# Patient Record
Sex: FEMALE | Race: BLACK OR AFRICAN AMERICAN | NOT HISPANIC OR LATINO | Employment: OTHER | URBAN - METROPOLITAN AREA
[De-identification: names, ages, dates, MRNs, and addresses within clinical notes are randomized per-mention and may not be internally consistent; named-entity substitution may affect disease eponyms.]

---

## 2017-01-26 ENCOUNTER — OFFICE VISIT (OUTPATIENT)
Dept: ENDOCRINOLOGY | Facility: CLINIC | Age: 52
End: 2017-01-26
Payer: MEDICARE

## 2017-01-26 ENCOUNTER — LAB VISIT (OUTPATIENT)
Dept: LAB | Facility: HOSPITAL | Age: 52
End: 2017-01-26
Attending: INTERNAL MEDICINE
Payer: MEDICARE

## 2017-01-26 VITALS
HEART RATE: 83 BPM | SYSTOLIC BLOOD PRESSURE: 132 MMHG | DIASTOLIC BLOOD PRESSURE: 78 MMHG | BODY MASS INDEX: 32.93 KG/M2 | WEIGHT: 185.88 LBS | HEIGHT: 63 IN

## 2017-01-26 DIAGNOSIS — E11.42 DIABETIC POLYNEUROPATHY ASSOCIATED WITH TYPE 2 DIABETES MELLITUS: Primary | Chronic | ICD-10-CM

## 2017-01-26 DIAGNOSIS — E11.42 DIABETIC POLYNEUROPATHY ASSOCIATED WITH TYPE 2 DIABETES MELLITUS: Chronic | ICD-10-CM

## 2017-01-26 DIAGNOSIS — E11.10 DIABETIC KETOACIDOSIS WITHOUT COMA ASSOCIATED WITH TYPE 2 DIABETES MELLITUS: ICD-10-CM

## 2017-01-26 DIAGNOSIS — E16.2 HYPOGLYCEMIA: ICD-10-CM

## 2017-01-26 LAB
ANION GAP SERPL CALC-SCNC: 18 MMOL/L
BUN SERPL-MCNC: 35 MG/DL
CALCIUM SERPL-MCNC: 9.3 MG/DL
CHLORIDE SERPL-SCNC: 91 MMOL/L
CO2 SERPL-SCNC: 19 MMOL/L
CREAT SERPL-MCNC: 2.7 MG/DL
EST. GFR  (AFRICAN AMERICAN): 22 ML/MIN/1.73 M^2
EST. GFR  (NON AFRICAN AMERICAN): 19 ML/MIN/1.73 M^2
GLUCOSE SERPL-MCNC: 708 MG/DL
POTASSIUM SERPL-SCNC: 4.6 MMOL/L
SODIUM SERPL-SCNC: 128 MMOL/L

## 2017-01-26 PROCEDURE — 99999 PR PBB SHADOW E&M-EST. PATIENT-LVL IV: CPT | Mod: PBBFAC,,, | Performed by: NURSE PRACTITIONER

## 2017-01-26 PROCEDURE — 80048 BASIC METABOLIC PNL TOTAL CA: CPT | Mod: PO

## 2017-01-26 PROCEDURE — 36415 COLL VENOUS BLD VENIPUNCTURE: CPT | Mod: PO

## 2017-01-26 PROCEDURE — 99215 OFFICE O/P EST HI 40 MIN: CPT | Mod: S$PBB,,, | Performed by: NURSE PRACTITIONER

## 2017-01-26 RX ORDER — HYDRALAZINE HYDROCHLORIDE 50 MG/1
50 TABLET, FILM COATED ORAL 3 TIMES DAILY
Status: ON HOLD | COMMUNITY
End: 2017-03-31 | Stop reason: HOSPADM

## 2017-01-26 RX ORDER — GLUCAGON 1 MG
1 VIAL (EA) INJECTION ONCE
Status: ON HOLD | COMMUNITY
Start: 2016-12-19 | End: 2018-04-27 | Stop reason: HOSPADM

## 2017-01-26 RX ORDER — HYDROCODONE BITARTRATE AND ACETAMINOPHEN 10; 325 MG/1; MG/1
TABLET ORAL
COMMUNITY
Start: 2016-11-14 | End: 2017-03-29

## 2017-01-26 RX ORDER — HYDRALAZINE HYDROCHLORIDE 25 MG/1
25 TABLET, FILM COATED ORAL 3 TIMES DAILY
Status: ON HOLD | COMMUNITY
End: 2017-08-17 | Stop reason: HOSPADM

## 2017-01-26 RX ORDER — INSULIN DEGLUDEC 100 U/ML
22 INJECTION, SOLUTION SUBCUTANEOUS NIGHTLY
Qty: 15 SYRINGE | Refills: 6 | Status: ON HOLD | OUTPATIENT
Start: 2017-01-26 | End: 2017-03-06 | Stop reason: HOSPADM

## 2017-01-26 RX ORDER — HYDRALAZINE HYDROCHLORIDE 100 MG/1
TABLET, FILM COATED ORAL
COMMUNITY
Start: 2017-01-10 | End: 2017-01-26 | Stop reason: DRUGHIGH

## 2017-01-26 RX ORDER — INSULIN ASPART 100 [IU]/ML
INJECTION, SOLUTION INTRAVENOUS; SUBCUTANEOUS
Qty: 1 BOX | Refills: 6 | Status: ON HOLD | OUTPATIENT
Start: 2017-01-26 | End: 2017-03-06 | Stop reason: HOSPADM

## 2017-01-26 RX ORDER — LIDOCAINE 50 MG/G
OINTMENT TOPICAL
Status: ON HOLD | COMMUNITY
Start: 2017-01-18 | End: 2017-03-31 | Stop reason: HOSPADM

## 2017-01-26 NOTE — MR AVS SNAPSHOT
[facility] Endocrinology  [address] [facility] [address] [place] [address]  Phone: [phone]  Fax: [phone]                  Sherice Avalos   2017 10:00 AM   Office Visit    Description:  Female : 1965   Provider:  OSMANI Purdy,ANP-C   Department:  [place] - Endocrinology           Diagnoses this Visit        Comments    Diabetic polyneuropathy associated with type 2 diabetes mellitus    -  Primary     Uncontrolled type 2 diabetes mellitus with stage 3 chronic kidney disease, with long-term current use of insulin         Hypoglycemia                To Do List           Future Appointments        Provider Department Dept Phone    2017 12:30 PM URINE [facility] [phone]    2017 11:00 AM OSMANI Purdy,ANP-C [facility] [phone]      Goals (5 Years of Data)     None       These Medications        Disp Refills Start End    insulin degludec (TRESIBA FLEXTOUCH U-100) 100 unit/mL (3 mL) InPn 15 Syringe 6 2017     Inject 22 Units into the skin nightly. - Subcutaneous    Pharmacy: [facility] - [address] Ph #: [phone]       insulin aspart (NOVOLOG FLEXPEN) 100 unit/mL InPn pen 1 Box 6 2017     4 units with meals plus ss-for max dose of 10 units per meal (30 per day)    Pharmacy: [facility] - [address] Ph #: [phone]         [facility]     [facility] -  Assistance  Registered nurses in the [facility] provide clinical advisement, health education, appointment booking, and other advisory services.  Call for this free service at [phone].             Medications           Message regarding Medications     Verify the changes and/or additions to your medication regime listed below are the same as discussed with your clinician today.  If any of these changes or additions are incorrect, please  notify your healthcare provider.        START taking these NEW medications        Refills    insulin degludec (TRESIBA FLEXTOUCH U-100) 100 unit/mL (3 mL) InPn 6    Sig: Inject 22 Units into the skin nightly.    Class: Normal    Route: Subcutaneous    insulin aspart (NOVOLOG FLEXPEN) 100 unit/mL InPn pen 6    Si units with meals plus ss-for max dose of 10 units per meal (30 per day)    Class: Normal           Verify that the below list of medications is an accurate representation of the medications you are currently taking.  If none reported, the list may be blank. If incorrect, please contact your healthcare provider. Carry this list with you in case of emergency.           Current Medications     amlodipine (NORVASC) 10 MG tablet Take 1 tablet (10 mg total) by mouth every morning.    ergocalciferol (ERGOCALCIFEROL) 50,000 unit Cap Take 1 capsule (50,000 Units total) by mouth every 7 days.    ferrous gluconate (FERGON) 324 MG tablet Take 1 tablet (324 mg total) by mouth daily with breakfast.    furosemide (LASIX) 40 MG tablet Take 40 mg by mouth every morning.    gabapentin (NEURONTIN) 100 MG capsule Take 100 mg by mouth 2 (two) times daily.    GLUCAGON EMERGENCY KIT, HUMAN, 1 mg injection     hydrALAZINE (APRESOLINE) 25 MG tablet Take 25 mg by mouth 3 (three) times daily.    hydrALAZINE (APRESOLINE) 50 MG tablet Take 50 mg by mouth 3 (three) times daily.    hydrocodone-acetaminophen 10-325mg (NORCO)  mg Tab     insulin aspart (NOVOLOG) 100 unit/mL InPn pen Inject 4 Units into the skin 3 (three) times daily with meals.    insulin detemir (LEVEMIR FLEXTOUCH) 100 unit/mL (3 mL) SubQ InPn pen Inject 10 Units into the skin once daily. 10 units in morning    insulin detemir (LEVEMIR FLEXTOUCH) 100 unit/mL (3 mL) SubQ InPn pen Inject 11 Units into the skin every evening.    levothyroxine (SYNTHROID) 50 MCG tablet Take 50 mcg by mouth before breakfast.     lidocaine (XYLOCAINE) 5 % Oint ointment     lubiprostone  "(AMITIZA) 8 MCG Cap Take 8 mcg by mouth daily with breakfast.     metoprolol succinate (TOPROL-XL) 100 MG 24 hr tablet Take 1 tablet (100 mg total) by mouth 2 (two) times daily.    montelukast (SINGULAIR) 10 mg tablet Take 10 mg by mouth every evening.     NEXIUM 40 mg capsule TAKE ONE CAPSULE BY MOUTH ONCE DAILY    ondansetron (ZOFRAN) 4 MG tablet Take 4 mg by mouth every 6 (six) hours as needed for Nausea.     promethazine (PHENERGAN) 25 MG tablet Take 1 tablet (25 mg total) by mouth every 6 (six) hours as needed for Nausea.    ranolazine (RANEXA) 500 MG Tb12 Take 500 mg by mouth 2 (two) times daily.     sevelamer carbonate (RENVELA) 800 mg Tab Take 800 mg by mouth 3 (three) times daily with meals.    valsartan (DIOVAN) 40 MG tablet Take 1 tablet (40 mg total) by mouth every evening.    VENTOLIN HFA 90 mcg/actuation inhaler INHALE 1 PUFF BY MOUTH EVERY 6 TO 8 HOURS AS NEEDED    fluticasone-vilanterol (BREO) 100-25 mcg/dose diskus inhaler Inhale 1 puff into the lungs once daily.    insulin aspart (NOVOLOG FLEXPEN) 100 unit/mL InPn pen 4 units with meals plus ss-for max dose of 10 units per meal (30 per day)    insulin degludec (TRESIBA FLEXTOUCH U-100) 100 unit/mL (3 mL) InPn Inject 22 Units into the skin nightly.           Clinical Reference Information           Vital Signs - Last Recorded  Most recent update: 1/26/2017  9:51 AM by Pat Dowd LPN    BP Pulse Ht Wt LMP BMI    132/78 (BP Location: Right arm, Patient Position: Sitting, BP Method: Manual) 83 5' 3" (1.6 m) 84.3 kg (185 lb 13.6 oz) (LMP Unknown) 32.92 kg/m2      Blood Pressure          Most Recent Value    BP  132/78      Allergies as of 1/26/2017     Reglan [Metoclopramide Hcl]    Sulfa (Sulfonamide Antibiotics)    Warfarin      Immunizations Administered on Date of Encounter - 1/26/2017     None      Orders Placed During Today's Visit      Normal Orders This Visit    C-peptide     Glucose, fasting     Urinalysis     Future Labs/Procedures " Expected by Expires    Basic metabolic panel  1/26/2017 3/27/2018    Basic metabolic panel  1/26/2017 3/27/2018    C-peptide  1/26/2017 3/27/2018    C-peptide  1/26/2017 3/27/2018    Glucose, fasting  1/26/2017 3/27/2018    Glucose, fasting  1/26/2017 3/27/2018    Urinalysis  1/26/2017 3/27/2018    Microalbumin/creatinine urine ratio  5/29/2017 3/27/2018    Microalbumin/creatinine urine ratio  5/29/2017 3/27/2018

## 2017-01-26 NOTE — PROGRESS NOTES
Subjective:      Patient ID: Sherice Avalos is a 49 y.o. female.    Chief Complaint:  Urgent visit s/p hospital d/c     History of Present Illness  48 y/o AAF -with Type 2 DM since approx 2000 as well as HTN, CKD stage 3.   Pt recently admitted to hospital with glucose of 1210, and per daughter a diagnosis of pancreatitis.  Difficult situation.  Pt has never brought me glucose logs though repeated requested as well as the importance emphasized.  .Pt has previously been a Very questionable historian and family support also questionable though daughter present at last visit and todays.  At our last visit I pro was reviewed--which essentially indicated pt was missing insulin with meals with marked hyperglycemia. Also last visit logs indicated if pt was taking insulin the doses were erratic, were not ordered and made no logical sense.    Insulin doses:    Daughter and pt identify pt routine as:  Coffee--no breakfast--but pt states occasionally toast or a biscuit  --No lunch or little  --Supper in the evening.     Review of Systems   Objective:   Physical Exam   Constitutional: She is oriented to person, place, and time. She appears well-developed and well-nourished.     Lab Review:   Hemoglobin A1C   Date Value Range Status   8/26/2014 13.9* 4.5-6.2 % Final   5/9/2014 12.0* 4.5-6.2 % Final   2/13/2014 13.4* 4.5-6.2 % Final       Chemistry        Component Value Date/Time     2/13/2014 1025    K 3.9 2/13/2014 1025     2/13/2014 1025    CO2 25 2/13/2014 1025    BUN 26* 2/13/2014 1025    CREATININE 1.1 2/13/2014 1025    GLU 36* 2/13/2014 1025        Component Value Date/Time    CALCIUM 9.6 2/13/2014 1025    ALKPHOS 109 2/13/2014 1025    AST 15 2/13/2014 1025    ALT 10 2/13/2014 1025    BILITOT 0.2 2/13/2014 1025        Lab Results   Component Value Date    TSH 1.176 5/9/2014     Lab Results   Component Value Date    LDLCALC 123.0 2/13/2014     .  Assessment:     No diagnosis found.      Plan:   I  D/c lantus  and novolog    Start Novolog 70/30    15 units with breakfast which can include coffee and at least 1 slice of toast      25 units of 70/30 with supper.        ORDERS  1/26/17      4 mo with glucose log review.   External orders for glucose and C-peptide.

## 2017-01-26 NOTE — PROGRESS NOTES
Subjective:       Patient ID: Sherice Avalos is a 51 y.o. female.    Chief Complaint: Routine Type 2 DM f/u     HPI Pt is a 52 y/o AAF with a dx of Of Type 2 Dm in 2000 in setting of glucoses >1200 and pancreatitis.  Pt also with chronic conditions pending review including HTN, HLP.      Interim Event:  Pt  Not seen since Jan 2015--and then it was only a couple of times--Pt very poor historian-chart reviewed--freq admits r/t both hyper and hypoglycemia.  Daughter states ambulance called frequently r/t both high and low glucoses.  Chart reviewed.  Pt last admitted [facility] 3 f/t high glucoses--also note BNP of >12,000, and JADE.  No glucose logs.  States glucoses are up and down.  States compliance with shots and does not skip doses.  Daughter seems to be good support-takes lots of notes-but also limited understanding of DM nuances.  States pt also uses a Novolog ss--2 units for every 50 points over 200.       States  today,  79 yesterday.   Only had coffee with splenda and cream today--took 4 units of Novolog.    10:30 am--glucose > 500--8 units of novolog given.      Takes Levermir 10 and 11 units bid  Tripp 4-4-4 plus ss (200-250=+2).       Review of Systems   Constitutional: Negative for activity change and fatigue.   HENT: Negative for hearing loss and trouble swallowing.    Eyes: Positive for visual disturbance. Negative for photophobia.        Last Eye Exam   Respiratory: Positive for shortness of breath. Negative for cough.    Cardiovascular: Negative for chest pain and palpitations.   Gastrointestinal: Negative for constipation and diarrhea.   Genitourinary: Positive for frequency. Negative for urgency.   Musculoskeletal: Negative for arthralgias and myalgias.   Skin: Negative for rash and wound.   Neurological: Positive for numbness. Negative for weakness.   Psychiatric/Behavioral: Negative for sleep disturbance. The patient is not nervous/anxious.        Objective:      Physical Exam  "  Constitutional: She is oriented to person, place, and time. She appears well-developed and well-nourished.   Vital Signs  Pulse: 83  BP: 132/78  BP Location: Right arm  BP Method: Manual  Patient Position: Sitting  Pain Score:   7  Pain Loc: Back  Height and Weight  Height: 5' 3" (160 cm)  Weight: 84.3 kg (185 lb 13.6 oz)  BSA (Calculated - sq m): 1.94 sq meters  BMI (Calculated): 33  Weight in (lb) to have BMI = 25: 140.8]     HENT:   Head: Normocephalic and atraumatic.   Nose: Nose normal.   Mouth/Throat: Oropharynx is clear and moist.   adentulous    Eyes: Conjunctivae and EOM are normal. Pupils are equal, round, and reactive to light.   Neck: Normal range of motion. Neck supple. No tracheal deviation present.   Cardiovascular: Normal rate, regular rhythm and intact distal pulses.    Murmur heard.  Pulmonary/Chest: Effort normal and breath sounds normal.   Musculoskeletal: Normal range of motion. Edema: 1 pitting edema LE bilaerally    Feet: no open wounds or calluses.  L partial foot amputation r/t .     Protective Sensation (w/ 10 gram monofilament):  Right: Decreased  Left: Decreased    Visual Inspection:  Partial L foot amputation     Pedal Pulses:   Right: Present  Left: Present    Posterior tibialis:   Right:Absent  Left: Absent     Lymphadenopathy:     She has no cervical adenopathy.   Neurological: She is alert and oriented to person, place, and time.   Vibratory sensation to feet:     Skin: Skin is warm and dry.   Psychiatric: She has a normal mood and affect. Her behavior is normal. Thought content normal.       Assessment:         1. Diabetic polyneuropathy associated with type 2 diabetes mellitus  insulin degludec (TRESIBA FLEXTOUCH U-100) 100 unit/mL (3 mL) InPn    insulin aspart (NOVOLOG FLEXPEN) 100 unit/mL InPn pen    Basic metabolic panel    Microalbumin/creatinine urine ratio    Glucose, fasting    C-peptide    Glucose, fasting    C-peptide    Glucose, fasting    C-peptide    Basic " metabolic panel    Microalbumin/creatinine urine ratio    Urinalysis    Urinalysis   2. Uncontrolled type 2 diabetes mellitus with stage 3 chronic kidney disease, with long-term current use of insulin     3. Hypoglycemia     4. Diabetic ketoacidosis without coma associated with type 2 diabetes mellitus  -acute-high risk--to ER.         Plan:       8 units of Novolog SUB at 11 am.  Labs resulted.  AG 18, glucos 706. Tone Walls called-will take pt to ER.    --Suspect pt may be insulinopenic or Type 1--will check c-peptide in future.    -Suspect reeducation of not only patient but family members as well  -Importance of glucose log reitierated.     -Will d/c levemir and switch to 22 Tresiba u100 qhs (less nocturnal hypoglycemia)  Cont Tripp 4-4-4--but SS needs to be decreased  (180-230=+1 units ). Suspect ISF closer to 1:50,  Not 1:25--may be contributary to hypoglycemia    -Pt to fax log next week     ORDERS  1/26/16  F/uy 4 weeks glucose log review.

## 2017-02-22 ENCOUNTER — TELEPHONE (OUTPATIENT)
Dept: ENDOCRINOLOGY | Facility: CLINIC | Age: 52
End: 2017-02-22

## 2017-02-22 NOTE — TELEPHONE ENCOUNTER
Called pt regarding missed apt.  Pt stated that she didn't know she had an apt and would like a new one.  Told the pt the first available apt, she said no, and hung up the phone.

## 2017-03-02 PROBLEM — E11.10 DKA (DIABETIC KETOACIDOSES): Status: ACTIVE | Noted: 2017-03-02

## 2017-03-03 PROBLEM — R10.9 ABDOMINAL PAIN: Status: ACTIVE | Noted: 2017-03-03

## 2017-03-04 PROBLEM — N18.9 CHRONIC KIDNEY DISEASE: Status: ACTIVE | Noted: 2017-03-04

## 2017-03-29 PROBLEM — E10.22 TYPE 1 DIABETES MELLITUS WITH STAGE 3 CHRONIC KIDNEY DISEASE: Status: ACTIVE | Noted: 2017-03-29

## 2017-03-29 PROBLEM — N18.30 TYPE 1 DIABETES MELLITUS WITH STAGE 3 CHRONIC KIDNEY DISEASE: Status: ACTIVE | Noted: 2017-03-29

## 2017-04-12 PROBLEM — N18.30 CKD STAGE 3 SECONDARY TO DIABETES: Status: ACTIVE | Noted: 2017-03-04

## 2017-04-12 PROBLEM — E03.9 HYPOTHYROIDISM (ACQUIRED): Status: ACTIVE | Noted: 2017-04-12

## 2017-04-12 PROBLEM — E11.22 CKD STAGE 3 SECONDARY TO DIABETES: Status: ACTIVE | Noted: 2017-03-04

## 2017-04-12 PROBLEM — E11.10 DKA (DIABETIC KETOACIDOSES): Status: RESOLVED | Noted: 2017-03-02 | Resolved: 2017-04-12

## 2017-04-18 PROBLEM — E87.1 HYPONATREMIA: Status: ACTIVE | Noted: 2017-04-18

## 2017-04-18 PROBLEM — E11.10 DIABETIC KETOACIDOSIS WITHOUT COMA: Status: ACTIVE | Noted: 2017-04-18

## 2017-04-20 PROBLEM — N17.9 ACUTE RENAL FAILURE: Status: ACTIVE | Noted: 2017-04-20

## 2017-05-21 PROBLEM — E87.5 HYPERKALEMIA: Status: ACTIVE | Noted: 2017-05-21

## 2017-05-21 PROBLEM — R22.0 FACIAL SWELLING: Status: ACTIVE | Noted: 2017-05-21

## 2017-05-22 PROBLEM — R06.02 SOB (SHORTNESS OF BREATH): Status: ACTIVE | Noted: 2017-05-22

## 2017-07-15 PROBLEM — E11.00 HYPEROSMOLAR NON-KETOTIC STATE IN PATIENT WITH TYPE 2 DIABETES MELLITUS: Status: ACTIVE | Noted: 2017-07-15

## 2017-07-15 PROBLEM — E11.65 TYPE 2 DIABETES MELLITUS WITH HYPERGLYCEMIA, WITH LONG-TERM CURRENT USE OF INSULIN: Status: ACTIVE | Noted: 2017-07-15

## 2017-07-15 PROBLEM — Z79.4 TYPE 2 DIABETES MELLITUS WITH HYPERGLYCEMIA, WITH LONG-TERM CURRENT USE OF INSULIN: Status: ACTIVE | Noted: 2017-07-15

## 2017-07-16 PROBLEM — E66.9 OBESITY (BMI 30-39.9): Status: ACTIVE | Noted: 2017-07-16

## 2017-08-13 PROBLEM — E11.10 DKA (DIABETIC KETOACIDOSES): Status: ACTIVE | Noted: 2017-08-13

## 2017-08-13 PROBLEM — R19.7 DIARRHEA: Status: ACTIVE | Noted: 2017-08-13

## 2017-09-04 PROBLEM — N18.30 ACUTE RENAL FAILURE SUPERIMPOSED ON STAGE 3 CHRONIC KIDNEY DISEASE: Status: ACTIVE | Noted: 2017-09-04

## 2017-09-04 PROBLEM — E86.1 INTRAVASCULAR VOLUME DEPLETION: Status: ACTIVE | Noted: 2017-09-04

## 2017-09-04 PROBLEM — N17.9 ACUTE RENAL FAILURE SUPERIMPOSED ON STAGE 3 CHRONIC KIDNEY DISEASE: Status: ACTIVE | Noted: 2017-09-04

## 2017-09-05 PROBLEM — E10.10 DIABETIC KETOACIDOSIS WITHOUT COMA ASSOCIATED WITH TYPE 1 DIABETES MELLITUS: Status: ACTIVE | Noted: 2017-04-18

## 2017-09-05 PROBLEM — S81.801A WOUND OF RIGHT LOWER EXTREMITY: Status: ACTIVE | Noted: 2017-09-05

## 2017-09-08 PROBLEM — T36.95XA ANTIBIOTIC-ASSOCIATED DIARRHEA: Status: ACTIVE | Noted: 2017-09-08

## 2017-09-08 PROBLEM — R53.1 WEAKNESS: Status: ACTIVE | Noted: 2017-09-08

## 2017-09-08 PROBLEM — K52.1 ANTIBIOTIC-ASSOCIATED DIARRHEA: Status: ACTIVE | Noted: 2017-09-08

## 2017-09-12 PROBLEM — E11.10 DKA (DIABETIC KETOACIDOSES): Status: RESOLVED | Noted: 2017-08-13 | Resolved: 2017-09-12

## 2017-09-12 PROBLEM — K52.1 ANTIBIOTIC-ASSOCIATED DIARRHEA: Status: RESOLVED | Noted: 2017-09-08 | Resolved: 2017-09-12

## 2017-09-12 PROBLEM — T36.95XA ANTIBIOTIC-ASSOCIATED DIARRHEA: Status: RESOLVED | Noted: 2017-09-08 | Resolved: 2017-09-12

## 2017-10-12 PROBLEM — E10.10 DIABETIC KETOACIDOSIS WITHOUT COMA ASSOCIATED WITH TYPE 1 DIABETES MELLITUS: Status: RESOLVED | Noted: 2017-04-18 | Resolved: 2017-10-12

## 2017-10-12 PROBLEM — R22.0 FACIAL SWELLING: Status: RESOLVED | Noted: 2017-05-21 | Resolved: 2017-10-12

## 2017-10-12 PROBLEM — N18.30 ACUTE RENAL FAILURE SUPERIMPOSED ON STAGE 3 CHRONIC KIDNEY DISEASE: Status: RESOLVED | Noted: 2017-09-04 | Resolved: 2017-10-12

## 2017-10-12 PROBLEM — E87.5 HYPERKALEMIA: Status: RESOLVED | Noted: 2017-05-21 | Resolved: 2017-10-12

## 2017-10-12 PROBLEM — N17.9 ACUTE RENAL FAILURE SUPERIMPOSED ON STAGE 3 CHRONIC KIDNEY DISEASE: Status: RESOLVED | Noted: 2017-09-04 | Resolved: 2017-10-12

## 2017-10-12 PROBLEM — E87.1 HYPONATREMIA: Status: RESOLVED | Noted: 2017-04-18 | Resolved: 2017-10-12

## 2017-10-12 PROBLEM — E66.9 OBESITY (BMI 30-39.9): Status: RESOLVED | Noted: 2017-07-16 | Resolved: 2017-10-12

## 2017-10-12 PROBLEM — E11.00 HYPEROSMOLAR NON-KETOTIC STATE IN PATIENT WITH TYPE 2 DIABETES MELLITUS: Status: RESOLVED | Noted: 2017-07-15 | Resolved: 2017-10-12

## 2017-10-12 PROBLEM — R06.02 SOB (SHORTNESS OF BREATH): Status: RESOLVED | Noted: 2017-05-22 | Resolved: 2017-10-12

## 2017-10-12 PROBLEM — N17.9 ACUTE RENAL FAILURE: Status: RESOLVED | Noted: 2017-04-20 | Resolved: 2017-10-12

## 2017-10-12 PROBLEM — R53.1 WEAKNESS: Status: RESOLVED | Noted: 2017-09-08 | Resolved: 2017-10-12

## 2017-10-12 PROBLEM — S81.801A WOUND OF RIGHT LOWER EXTREMITY: Status: RESOLVED | Noted: 2017-09-05 | Resolved: 2017-10-12

## 2017-10-13 PROBLEM — M54.9 CHRONIC BACK PAIN: Status: ACTIVE | Noted: 2017-10-13

## 2017-10-13 PROBLEM — G89.29 CHRONIC BACK PAIN: Status: ACTIVE | Noted: 2017-10-13

## 2018-02-15 PROBLEM — R79.89 ELEVATED TROPONIN: Status: ACTIVE | Noted: 2018-02-15

## 2018-02-15 PROBLEM — N30.00 ACUTE CYSTITIS WITHOUT HEMATURIA: Status: ACTIVE | Noted: 2018-02-15

## 2018-02-28 PROBLEM — E10.10 TYPE 1 DIABETES MELLITUS WITH KETOACIDOSIS WITHOUT COMA: Status: ACTIVE | Noted: 2018-02-28

## 2018-02-28 PROBLEM — E10.10 DKA, TYPE 1: Status: ACTIVE | Noted: 2018-02-28

## 2018-02-28 PROBLEM — L02.92 FURUNCULOSIS: Status: ACTIVE | Noted: 2018-02-28

## 2018-04-22 PROBLEM — R11.2 NAUSEA WITH VOMITING: Status: ACTIVE | Noted: 2018-04-22

## 2018-04-22 PROBLEM — E11.10 DIABETIC KETOACIDOSIS WITHOUT COMA ASSOCIATED WITH TYPE 2 DIABETES MELLITUS: Status: ACTIVE | Noted: 2018-04-22

## 2018-04-26 PROBLEM — R11.2 NAUSEA WITH VOMITING: Status: RESOLVED | Noted: 2018-04-22 | Resolved: 2018-04-26

## 2018-04-26 PROBLEM — E11.10 DIABETIC KETOACIDOSIS WITHOUT COMA ASSOCIATED WITH TYPE 2 DIABETES MELLITUS: Status: RESOLVED | Noted: 2018-04-22 | Resolved: 2018-04-26

## 2018-04-26 PROBLEM — R19.7 DIARRHEA: Status: RESOLVED | Noted: 2017-08-13 | Resolved: 2018-04-26

## 2018-04-29 PROBLEM — I35.0 NONRHEUMATIC AORTIC VALVE STENOSIS: Status: ACTIVE | Noted: 2018-04-29

## 2018-04-29 PROBLEM — M54.50 LOW BACK PAIN: Status: ACTIVE | Noted: 2018-04-29

## 2018-06-11 PROBLEM — I10 PRIMARY HYPERTENSION: Status: ACTIVE | Noted: 2018-06-11

## 2018-07-11 PROBLEM — E10.10 DKA, TYPE 1: Status: RESOLVED | Noted: 2018-02-28 | Resolved: 2018-07-11

## 2018-09-26 PROBLEM — E87.70 VOLUME OVERLOAD: Status: ACTIVE | Noted: 2018-09-26

## 2018-10-02 PROBLEM — R06.02 SOB (SHORTNESS OF BREATH): Status: ACTIVE | Noted: 2018-10-02

## 2018-10-09 ENCOUNTER — TELEPHONE (OUTPATIENT)
Dept: CARDIOLOGY | Facility: CLINIC | Age: 53
End: 2018-10-09

## 2018-10-09 NOTE — TELEPHONE ENCOUNTER
Called pt and offered sooner appt. Pt was unable to make new provided time and decided to keep the previously scheduled appt.

## 2018-12-11 PROBLEM — N18.6 END STAGE RENAL DISEASE: Status: ACTIVE | Noted: 2018-12-11

## 2018-12-20 PROBLEM — I21.4 NSTEMI (NON-ST ELEVATED MYOCARDIAL INFARCTION): Status: ACTIVE | Noted: 2018-12-20

## 2018-12-20 PROBLEM — R79.89 ELEVATED TROPONIN: Status: RESOLVED | Noted: 2018-02-15 | Resolved: 2018-12-20

## 2018-12-20 PROBLEM — I48.92 ATRIAL FLUTTER: Status: ACTIVE | Noted: 2018-12-20

## 2019-02-07 PROBLEM — E10.51 TYPE 1 DIABETES MELLITUS WITH DIABETIC PERIPHERAL ANGIOPATHY WITHOUT GANGRENE: Status: ACTIVE | Noted: 2019-02-07

## 2019-02-07 PROBLEM — N18.30 TYPE 1 DIABETES MELLITUS WITH STAGE 3 CHRONIC KIDNEY DISEASE: Status: RESOLVED | Noted: 2017-03-29 | Resolved: 2019-02-07

## 2019-02-07 PROBLEM — E10.22 TYPE 1 DIABETES MELLITUS WITH STAGE 3 CHRONIC KIDNEY DISEASE: Status: RESOLVED | Noted: 2017-03-29 | Resolved: 2019-02-07

## 2019-02-07 PROBLEM — E10.10 TYPE 1 DIABETES MELLITUS WITH KETOACIDOSIS WITHOUT COMA: Status: RESOLVED | Noted: 2018-02-28 | Resolved: 2019-02-07

## 2019-02-07 PROBLEM — E11.65 TYPE 2 DIABETES MELLITUS WITH HYPERGLYCEMIA, WITH LONG-TERM CURRENT USE OF INSULIN: Status: RESOLVED | Noted: 2017-07-15 | Resolved: 2019-02-07

## 2019-02-07 PROBLEM — E87.70 VOLUME OVERLOAD: Status: RESOLVED | Noted: 2018-09-26 | Resolved: 2019-02-07

## 2019-02-07 PROBLEM — Z79.4 TYPE 2 DIABETES MELLITUS WITH HYPERGLYCEMIA, WITH LONG-TERM CURRENT USE OF INSULIN: Status: RESOLVED | Noted: 2017-07-15 | Resolved: 2019-02-07

## 2019-02-07 PROBLEM — N17.9 ACUTE RENAL FAILURE SUPERIMPOSED ON STAGE 3 CHRONIC KIDNEY DISEASE: Status: RESOLVED | Noted: 2017-09-04 | Resolved: 2019-02-07

## 2019-02-07 PROBLEM — I25.10 CORONARY ARTERY DISEASE INVOLVING NATIVE CORONARY ARTERY OF NATIVE HEART WITHOUT ANGINA PECTORIS: Status: ACTIVE | Noted: 2019-02-07

## 2019-02-07 PROBLEM — E87.1 HYPONATREMIA: Status: RESOLVED | Noted: 2017-04-18 | Resolved: 2019-02-07

## 2019-02-07 PROBLEM — N18.30 ACUTE RENAL FAILURE SUPERIMPOSED ON STAGE 3 CHRONIC KIDNEY DISEASE: Status: RESOLVED | Noted: 2017-09-04 | Resolved: 2019-02-07

## 2019-02-13 PROBLEM — K85.90 ACUTE PANCREATITIS WITHOUT INFECTION OR NECROSIS: Status: ACTIVE | Noted: 2019-02-13

## 2019-02-15 PROBLEM — E11.10 DKA (DIABETIC KETOACIDOSES): Status: ACTIVE | Noted: 2019-02-15

## 2019-02-16 PROBLEM — N25.81 SECONDARY HYPERPARATHYROIDISM: Status: ACTIVE | Noted: 2019-02-16

## 2019-03-04 PROBLEM — D72.829 LEUKOCYTOSIS: Status: ACTIVE | Noted: 2019-03-04

## 2019-03-06 PROBLEM — D72.829 LEUKOCYTOSIS: Status: RESOLVED | Noted: 2019-03-04 | Resolved: 2019-03-06

## 2019-03-09 PROBLEM — I82.622 ACUTE DEEP VEIN THROMBOSIS (DVT) OF LEFT UPPER EXTREMITY: Status: ACTIVE | Noted: 2019-03-09

## 2019-03-10 PROBLEM — T82.590A DIALYSIS AV FISTULA MALFUNCTION: Status: ACTIVE | Noted: 2019-03-10

## 2019-03-11 PROBLEM — T82.858A ARTERIOVENOUS FISTULA STENOSIS: Status: ACTIVE | Noted: 2019-03-11

## 2019-03-26 PROBLEM — M25.511 RIGHT SHOULDER PAIN: Status: ACTIVE | Noted: 2019-03-26
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
## 2019-03-26 PROBLEM — R53.1 WEAKNESS: Status: ACTIVE | Noted: 2019-03-26

## 2019-03-27 PROBLEM — M75.100 ROTATOR CUFF TEAR: Status: ACTIVE | Noted: 2019-03-26

## 2019-04-22 PROBLEM — E11.8 TYPE 2 DIABETES MELLITUS WITH COMPLICATION, WITH LONG-TERM CURRENT USE OF INSULIN: Status: ACTIVE | Noted: 2017-07-15

## 2019-05-03 PROBLEM — N18.6 ESRD ON HEMODIALYSIS: Status: ACTIVE | Noted: 2019-05-03

## 2019-05-03 PROBLEM — Z99.2 ESRD ON HEMODIALYSIS: Status: ACTIVE | Noted: 2019-05-03

## 2019-05-10 ENCOUNTER — TELEPHONE (OUTPATIENT)
Dept: ORTHOPEDICS | Facility: CLINIC | Age: 54
End: 2019-05-10

## 2019-05-10 NOTE — TELEPHONE ENCOUNTER
appt scheduled for 5/16/19 per patient request    ----- Message from Esteban Avendano sent at 5/10/2019  1:39 PM CDT -----  Contact: Dtr/Kavita Ya  Unsuccessful call placed to office.  Kavita called in and stated patient was referred to Dr. Mims from the ER at [facility].  Kavita stated patient has some type of pulled muscle in her chest/arm area.  Kavita would like a call back at [phone]

## 2019-06-07 DIAGNOSIS — M75.121 COMPLETE TEAR OF RIGHT ROTATOR CUFF: Primary | ICD-10-CM

## 2019-06-10 ENCOUNTER — OFFICE VISIT (OUTPATIENT)
Dept: ORTHOPEDICS | Facility: CLINIC | Age: 54
End: 2019-06-10
Payer: MEDICARE

## 2019-06-10 ENCOUNTER — HOSPITAL ENCOUNTER (OUTPATIENT)
Dept: RADIOLOGY | Facility: HOSPITAL | Age: 54
Discharge: HOME | End: 2019-06-10
Attending: ORTHOPAEDIC SURGERY
Payer: MEDICARE

## 2019-06-10 VITALS — BODY MASS INDEX: 28.53 KG/M2 | WEIGHT: 161 LBS | HEIGHT: 63 IN
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
## 2019-06-10 DIAGNOSIS — M75.121 COMPLETE TEAR OF RIGHT ROTATOR CUFF: ICD-10-CM

## 2019-06-10 DIAGNOSIS — M25.511 ACUTE PAIN OF RIGHT SHOULDER: Primary | ICD-10-CM

## 2019-06-10 DIAGNOSIS — Z71.2 ENCOUNTER TO DISCUSS TEST RESULTS: ICD-10-CM

## 2019-06-10 PROCEDURE — 73030 XR SHOULDER TRAUMA 3 VIEW RIGHT: ICD-10-PCS | Mod: 26,RT,, | Performed by: RADIOLOGY

## 2019-06-10 PROCEDURE — 99203 PR OFFICE/OUTPT VISIT, NEW, LEVL III, 30-44 MIN: ICD-10-PCS | Mod: S$PBB,,, | Performed by: ORTHOPAEDIC SURGERY

## 2019-06-10 PROCEDURE — 99999 PR PBB SHADOW E&M-EST. PATIENT-LVL III: ICD-10-PCS | Mod: PBBFAC,,, | Performed by: ORTHOPAEDIC SURGERY

## 2019-06-10 PROCEDURE — 73030 X-RAY EXAM OF SHOULDER: CPT | Mod: TC,PO,RT

## 2019-06-10 PROCEDURE — 99213 OFFICE O/P EST LOW 20 MIN: CPT | Mod: PBBFAC,25,PN | Performed by: ORTHOPAEDIC SURGERY

## 2019-06-10 PROCEDURE — 73030 X-RAY EXAM OF SHOULDER: CPT | Mod: 26,RT,, | Performed by: RADIOLOGY

## 2019-06-10 PROCEDURE — 99999 PR PBB SHADOW E&M-EST. PATIENT-LVL III: CPT | Mod: PBBFAC,,, | Performed by: ORTHOPAEDIC SURGERY

## 2019-06-10 PROCEDURE — 99203 OFFICE O/P NEW LOW 30 MIN: CPT | Mod: S$PBB,,, | Performed by: ORTHOPAEDIC SURGERY

## 2019-06-10 NOTE — PROGRESS NOTES
"HISTORY OF PRESENT ILLNESS:  A 54 years old, right shoulder pain for couple of   months' time, noticed after a fall, throbbing pain, 8/10 on the pain scale.    Difficult to put her arm up overhead.  She had cortisone shot, but did have   temporary relief.    Exam today shows weak and painful cuff strength.    X-ray showed degenerative changes, cuff tear.    ASSESSMENT:  Rotator cuff tear in a 54-year old.    PLAN:  We will get her separate therapy.  We will check her back in a couple   months' time to see how things are coming along.      PBB/HN  dd: 06/10/2019 15:00:25 (CDT)  td: 06/11/2019 03:15:43 (CDT)  Doc ID   #[id number]  Job ID #[id number]    CC:     Further History  Aching pain  Worse with activity  Relieved with rest  No other associated symptoms  No other radiation    Further Exam  Alert and oriented  Pleasant  Contralateral limb has appropriate range of motion for age and condition  Contralateral limb has appropriate strength for age and condition  Contralateral limb has appropriate stability  for age and condition  No adenopathy  Pulses are appropriate for current condition  Skin is intact        Chief Complaint    Chief Complaint   Patient presents with    Right Shoulder - Pain, Injury       HPI  Sherice Avalos is a 54 y.o.  female who presents with       Past Medical History  Past Medical History:   Diagnosis Date    Abdominal pain of multiple sites     Asthma     CHF (congestive heart failure)     COPD (chronic obstructive pulmonary disease)     DKA (diabetic ketoacidoses)     Encounter for blood transfusion     ESRD (end stage renal disease)     Follwed by Dr. Karla Madrid ( [facility])    GERD (gastroesophageal reflux disease)     Helicobacter pylori (H. pylori) infection     Hyperlipidemia     Hypertension     Non compliance w medication regimen     Stroke 2016    "Light Stroke"    Thyroid disease     Type II or unspecified type diabetes mellitus without mention of " complication, uncontrolled 3/27/2014       Past Surgical History  Past Surgical History:   Procedure Laterality Date    ABDOMINAL SURGERY      Angioplasty-coronary  2018    [facility]    APPENDECTOMY      BIOPSY N/A 2016    [facility] at [facility]    VYTJOS-BYRPPK-DT N/A 10/14/2016    [facility] at [facility]    Tiiqki-bxmtmb-dh bilateral random kidney N/A 2018    Per[facility] by Constantino Martin [facility] at [facility]     SECTION, CLASSIC      CHOLECYSTECTOMY      CREATION, AV FISTULA Left 2018    [facility]    ESOPHAGOGASTRODUODENOSCOPY (EGD) N/A 2016    [facility]    ESOPHAGOGASTRODUODENOSCOPY (EGD) N/A 12/10/2015    [facility] Boogie Camacho [facility]    FISTULOGRAM N/A 3/11/2019    [facility] at [facility]    FOOT AMPUTATION Left     Fractional Flow Tyler (FFR), Coronary  2018    [facility]    Insertion,catheter,tunneled Right 10/2/2018    [facility]iggins [facility]    IVUS, Coronary  2018    [facility] at [facility]    Left heart cath Left 2018    [facility] at [facility]    LEFT OOPHORECTOMY Left     Percutaneous coronary intervention N/A 2018    [facility]    PTA, AV FISTULA N/A 3/11/2019    [facility] at [facility]    right carotid subclavian bypass      Stent, Drug Eluting, Single Vessel, Coronary  2018    [facility]    TRANSPOSITION, VEIN, BASILIC Left 2018    [facility]       Medications  Current Outpatient Medications   Medication Sig    acetaminophen (TYLENOL) 325 MG tablet Take 2 tablets (650 mg total) by mouth every 8 (eight) hours as needed.     "albuterol (ACCUNEB) 1.25 mg/3 mL Nebu Take 6 mLs (2.5 mg total) by nebulization every 4 (four) hours. Rescue  for 2 days    amLODIPine (NORVASC) 10 MG tablet TAKE 1 TABLET BY MOUTH ONCE DAILY    aspirin (ECOTRIN) 81 MG EC tablet Take 81 mg by mouth once daily.    azithromycin (Z-HANSEL) 250 MG tablet Take 1 tablet (250 mg total) by mouth once daily. Take first 2 tablets together, then 1 every day until finished.    BD ULTRA-FINE KAITLIN PEN NEEDLES 32 gauge x 5/32" Ndle use 3 TIMES DAILY    clopidogrel (PLAVIX) 75 mg tablet Take 1 tablet (75 mg total) by mouth once daily.    docusate sodium (COLACE) 100 MG capsule Take 1 capsule (100 mg total) by mouth 2 (two) times daily as needed for Constipation.    ergocalciferol (ERGOCALCIFEROL) 50,000 unit Cap Take 1 capsule (50,000 Units total) by mouth every 7 days. (Patient taking differently: Take 50,000 Units by mouth every 7 days. Patient taking on Wednesday)    esomeprazole (NEXIUM) 40 MG capsule Take 1 capsule (40 mg total) by mouth once daily.    fluticasone-vilanterol (BREO ELLIPTA) 100-25 mcg/dose diskus inhaler Inhale 1 puff into the lungs once daily. Controller    GRALISE 600 mg Tb24 Take 600 mg by mouth 2 (two) times daily.     HYDROcodone-acetaminophen (NORCO) 5-325 mg per tablet Take 1 tablet by mouth every 8 (eight) hours as needed for Pain (shoulder pain).    insulin aspart U-100 (NOVOLOG) 100 unit/mL InPn pen Inject 3 Units into the skin 3 (three) times daily with meals.    insulin detemir U-100 (LEVEMIR FLEXTOUCH) 100 unit/mL (3 mL) SubQ InPn pen Inject 10 Units into the skin once daily.    insulin detemir U-100 (LEVEMIR FLEXTOUCH) 100 unit/mL (3 mL) SubQ InPn pen Inject 5 Units into the skin every evening.    isosorbide mononitrate (IMDUR) 60 MG 24 hr tablet Take 1 tablet (60 mg) by mouth daily in the morning    levothyroxine (SYNTHROID) 50 MCG tablet Take 1 tablet (50 mcg total) by mouth before breakfast.    metoprolol tartrate (LOPRESSOR) 50 " MG tablet Take 1 tablet (50 mg total) by mouth 2 (two) times daily.    montelukast (SINGULAIR) 10 mg tablet Take 1 tablet (10 mg total) by mouth every evening.    ondansetron (ZOFRAN) 4 MG tablet TAKE ONE TABLET BY MOUTH EVERY 6 HOURS AS NEEDED FOR NAUSEA    sevelamer carbonate (RENVELA) 800 mg Tab Take 800 mg by mouth 3 (three) times daily with meals.    sodium bicarbonate 650 MG tablet Take 1 tablet (650 mg total) by mouth 2 (two) times daily.    tiZANidine (ZANAFLEX) 4 MG tablet Take 4 mg by mouth every 8 (eight) hours.      No current facility-administered medications for this visit.        Allergies  Review of patient's allergies indicates:   Allergen Reactions    Gabapentin Swelling     Facial swelling    Sulfa (sulfonamide antibiotics) Itching    Reglan [metoclopramide hcl] Anxiety     Shakes      Warfarin Other (See Comments)     Past with residual effects       Family History  Family History   Problem Relation Age of Onset    Cancer Father     Diabetes Brother        Social History  Social History     Socioeconomic History    Marital status:      Spouse name: Not on file    Number of children: Not on file    Years of education: Not on file    Highest education level: Not on file   Occupational History    Not on file   Social Needs    Financial resource strain: Not on file    Food insecurity:     Worry: Not on file     Inability: Not on file    Transportation needs:     Medical: Not on file     Non-medical: Not on file   Tobacco Use    Smoking status: Never Smoker    Smokeless tobacco: Never Used   Substance and Sexual Activity    Alcohol use: No    Drug use: No    Sexual activity: Not on file   Lifestyle    Physical activity:     Days per week: Not on file     Minutes per session: Not on file    Stress: Not on file   Relationships    Social connections:     Talks on phone: Not on file     Gets together: Not on file     Attends Hindu service: Not on file     Active  member of club or organization: Not on file     Attends meetings of clubs or organizations: Not on file     Relationship status: Not on file   Other Topics Concern    Not on file   Social History Narrative    Not on file               Review of Systems     Constitutional: Negative    HENT: Negative  Eyes: Negative  Respiratory: Negative  Cardiovascular: Negative  Musculoskeletal: HPI  Skin: Negative  Neurological: Negative  Hematological: Negative  Endocrine: Negative                 Physical Exam    There were no vitals filed for this visit.  Body mass index is 28.52 kg/m².  Physical Examination:     General appearance -  well appearing, and in no distress  Mental status - awake  Neck - supple  Chest -  symmetric air entry  Heart - normal rate   Abdomen - soft      Assessment     1. Acute pain of right shoulder    2. Fall, initial encounter    3. Encounter to discuss test results          Plan

## 2019-09-17 ENCOUNTER — TELEPHONE (OUTPATIENT)
Dept: HOME HEALTH SERVICES | Facility: HOSPITAL | Age: 54
End: 2019-09-17

## 2019-10-18 PROBLEM — L97.529 ULCER OF LEFT FOOT: Status: ACTIVE | Noted: 2019-10-18

## 2019-10-20 PROBLEM — R53.81 DEBILITY: Status: ACTIVE | Noted: 2019-03-26

## 2019-10-23 ENCOUNTER — TELEPHONE (OUTPATIENT)
Dept: HOME HEALTH SERVICES | Facility: HOSPITAL | Age: 54
End: 2019-10-23

## 2019-10-31 ENCOUNTER — TELEPHONE (OUTPATIENT)
Dept: HOME HEALTH SERVICES | Facility: HOSPITAL | Age: 54
End: 2019-10-31

## 2019-11-15 ENCOUNTER — EXTERNAL HOME HEALTH (OUTPATIENT)
Dept: HOME HEALTH SERVICES | Facility: HOSPITAL | Age: 54
End: 2019-11-15

## 2020-01-13 ENCOUNTER — EXTERNAL HOME HEALTH (OUTPATIENT)
Dept: HOME HEALTH SERVICES | Facility: HOSPITAL | Age: 55
End: 2020-01-13

## 2020-01-18 PROBLEM — N30.00 ACUTE CYSTITIS WITHOUT HEMATURIA: Status: ACTIVE | Noted: 2020-01-18

## 2020-01-20 PROBLEM — R41.82 CHANGE IN MENTAL STATUS: Status: ACTIVE | Noted: 2020-01-20

## 2020-02-04 PROBLEM — R41.82 CHANGE IN MENTAL STATUS: Status: RESOLVED | Noted: 2020-01-20 | Resolved: 2020-02-04

## 2020-02-10 ENCOUNTER — EXTERNAL HOME HEALTH (OUTPATIENT)
Dept: HOME HEALTH SERVICES | Facility: HOSPITAL | Age: 55
End: 2020-02-10

## 2020-02-10 ENCOUNTER — TELEPHONE (OUTPATIENT)
Dept: HOME HEALTH SERVICES | Facility: HOSPITAL | Age: 55
End: 2020-02-10

## 2020-02-17 ENCOUNTER — EXTERNAL HOME HEALTH (OUTPATIENT)
Dept: HOME HEALTH SERVICES | Facility: HOSPITAL | Age: 55
End: 2020-02-17

## 2020-02-27 ENCOUNTER — TELEPHONE (OUTPATIENT)
Dept: HOME HEALTH SERVICES | Facility: HOSPITAL | Age: 55
End: 2020-02-27

## 2020-03-03 ENCOUNTER — TELEPHONE (OUTPATIENT)
Dept: HOME HEALTH SERVICES | Facility: HOSPITAL | Age: 55
End: 2020-03-03

## 2020-04-13 ENCOUNTER — EXTERNAL HOME HEALTH (OUTPATIENT)
Dept: HOME HEALTH SERVICES | Facility: HOSPITAL | Age: 55
End: 2020-04-13

## 2020-04-27 ENCOUNTER — DOCUMENT SCAN (OUTPATIENT)
Dept: HOME HEALTH SERVICES | Facility: HOSPITAL | Age: 55
End: 2020-04-27

## 2020-05-14 ENCOUNTER — EXTERNAL HOME HEALTH (OUTPATIENT)
Dept: HOME HEALTH SERVICES | Facility: HOSPITAL | Age: 55
End: 2020-05-14

## 2020-05-29 PROBLEM — Z79.4 TYPE 2 DIABETES MELLITUS WITH COMPLICATION, WITH LONG-TERM CURRENT USE OF INSULIN: Status: RESOLVED | Noted: 2017-07-15 | Resolved: 2020-05-29

## 2020-05-29 PROBLEM — E11.8 TYPE 2 DIABETES MELLITUS WITH COMPLICATION, WITH LONG-TERM CURRENT USE OF INSULIN: Status: RESOLVED | Noted: 2017-07-15 | Resolved: 2020-05-29

## 2020-05-29 PROBLEM — R10.9 ABDOMINAL PAIN: Status: RESOLVED | Noted: 2017-03-03 | Resolved: 2020-05-29

## 2020-05-29 PROBLEM — E10.10 DIABETIC KETOACIDOSIS WITHOUT COMA ASSOCIATED WITH TYPE 1 DIABETES MELLITUS: Status: RESOLVED | Noted: 2017-04-18 | Resolved: 2020-05-29

## 2020-05-29 PROBLEM — N30.00 ACUTE CYSTITIS WITHOUT HEMATURIA: Status: RESOLVED | Noted: 2020-01-18 | Resolved: 2020-05-29

## 2020-05-29 PROBLEM — K85.90 ACUTE PANCREATITIS WITHOUT INFECTION OR NECROSIS: Status: RESOLVED | Noted: 2019-02-13 | Resolved: 2020-05-29

## 2020-05-29 PROBLEM — E11.10 DKA (DIABETIC KETOACIDOSES): Status: RESOLVED | Noted: 2019-02-15 | Resolved: 2020-05-29

## 2020-05-29 PROBLEM — R19.7 DIARRHEA: Status: RESOLVED | Noted: 2017-08-13 | Resolved: 2020-05-29

## 2020-06-18 PROBLEM — J18.9 RIGHT LOWER LOBE PNEUMONIA: Status: ACTIVE | Noted: 2020-06-18

## 2020-06-18 PROBLEM — E10.10 DIABETIC KETOACIDOSIS WITHOUT COMA ASSOCIATED WITH TYPE 1 DIABETES MELLITUS: Status: ACTIVE | Noted: 2020-06-18

## 2020-06-18 PROBLEM — Z71.89 ACP (ADVANCE CARE PLANNING): Status: ACTIVE | Noted: 2020-06-18

## 2020-06-18 PROBLEM — E10.10 DIABETIC KETOACIDOSIS WITHOUT COMA ASSOCIATED WITH TYPE 1 DIABETES MELLITUS: Status: RESOLVED | Noted: 2020-06-18 | Resolved: 2020-06-18

## 2020-07-09 ENCOUNTER — DOCUMENT SCAN (OUTPATIENT)
Dept: HOME HEALTH SERVICES | Facility: HOSPITAL | Age: 55
End: 2020-07-09

## 2020-07-22 ENCOUNTER — EXTERNAL HOME HEALTH (OUTPATIENT)
Dept: HOME HEALTH SERVICES | Facility: HOSPITAL | Age: 55
End: 2020-07-22

## 2020-09-18 PROBLEM — Z79.4 TYPE 2 DIABETES MELLITUS WITH KETOACIDOSIS WITHOUT COMA, WITH LONG-TERM CURRENT USE OF INSULIN: Status: ACTIVE | Noted: 2020-09-18

## 2020-09-18 PROBLEM — E11.10 DKA (DIABETIC KETOACIDOSES): Status: ACTIVE | Noted: 2020-09-18

## 2020-09-18 PROBLEM — E11.10 TYPE 2 DIABETES MELLITUS WITH KETOACIDOSIS WITHOUT COMA, WITH LONG-TERM CURRENT USE OF INSULIN: Status: ACTIVE | Noted: 2020-09-18

## 2020-09-21 ENCOUNTER — EXTERNAL HOME HEALTH (OUTPATIENT)
Dept: HOME HEALTH SERVICES | Facility: HOSPITAL | Age: 55
End: 2020-09-21

## 2020-10-21 ENCOUNTER — DOCUMENT SCAN (OUTPATIENT)
Dept: HOME HEALTH SERVICES | Facility: HOSPITAL | Age: 55
End: 2020-10-21

## 2020-11-12 ENCOUNTER — EXTERNAL HOME HEALTH (OUTPATIENT)
Dept: HOME HEALTH SERVICES | Facility: HOSPITAL | Age: 55
End: 2020-11-12

## 2020-11-12 PROBLEM — N30.00 ACUTE CYSTITIS WITHOUT HEMATURIA: Status: ACTIVE | Noted: 2020-11-12

## 2020-11-12 PROBLEM — R73.9 HYPERGLYCEMIA: Status: ACTIVE | Noted: 2020-11-12

## 2021-01-04 ENCOUNTER — DOCUMENT SCAN (OUTPATIENT)
Dept: HOME HEALTH SERVICES | Facility: HOSPITAL | Age: 56
End: 2021-01-04

## 2021-02-02 PROBLEM — J81.0 ACUTE PULMONARY EDEMA: Status: ACTIVE | Noted: 2021-02-02

## 2021-02-02 PROBLEM — N63.0 BREAST SWELLING: Status: ACTIVE | Noted: 2021-02-02

## 2021-02-05 PROBLEM — I82.90 OCCLUSIVE THROMBUS: Status: ACTIVE | Noted: 2021-02-05

## 2021-02-18 ENCOUNTER — DOCUMENT SCAN (OUTPATIENT)
Dept: HOME HEALTH SERVICES | Facility: HOSPITAL | Age: 56
End: 2021-02-18

## 2021-02-28 PROBLEM — R65.20 SEVERE SEPSIS: Status: ACTIVE | Noted: 2021-02-28

## 2021-02-28 PROBLEM — G93.41 ACUTE METABOLIC ENCEPHALOPATHY: Status: ACTIVE | Noted: 2021-02-28

## 2021-02-28 PROBLEM — A41.9 SEVERE SEPSIS: Status: ACTIVE | Noted: 2021-02-28

## 2021-02-28 PROBLEM — J18.9 LEFT LOWER LOBE PNEUMONIA: Status: ACTIVE | Noted: 2021-02-28

## 2021-03-08 ENCOUNTER — DOCUMENT SCAN (OUTPATIENT)
Dept: HOME HEALTH SERVICES | Facility: HOSPITAL | Age: 56
End: 2021-03-08

## 2021-03-12 ENCOUNTER — EXTERNAL HOME HEALTH (OUTPATIENT)
Dept: HOME HEALTH SERVICES | Facility: HOSPITAL | Age: 56
End: 2021-03-12

## 2021-03-12 ENCOUNTER — DOCUMENT SCAN (OUTPATIENT)
Dept: HOME HEALTH SERVICES | Facility: HOSPITAL | Age: 56
End: 2021-03-12

## 2021-03-19 ENCOUNTER — DOCUMENT SCAN (OUTPATIENT)
Dept: HOME HEALTH SERVICES | Facility: HOSPITAL | Age: 56
End: 2021-03-19

## 2021-03-22 ENCOUNTER — PATIENT OUTREACH (OUTPATIENT)
Dept: HOME HEALTH SERVICES | Facility: HOSPITAL | Age: 56
End: 2021-03-22

## 2021-03-23 ENCOUNTER — DOCUMENT SCAN (OUTPATIENT)
Dept: HOME HEALTH SERVICES | Facility: HOSPITAL | Age: 56
End: 2021-03-23

## 2021-03-23 ENCOUNTER — EXTERNAL HOME HEALTH (OUTPATIENT)
Dept: HOME HEALTH SERVICES | Facility: HOSPITAL | Age: 56
End: 2021-03-23

## 2021-03-24 ENCOUNTER — DOCUMENT SCAN (OUTPATIENT)
Dept: HOME HEALTH SERVICES | Facility: HOSPITAL | Age: 56
End: 2021-03-24

## 2021-05-10 PROBLEM — J81.0 ACUTE PULMONARY EDEMA: Status: RESOLVED | Noted: 2021-02-02 | Resolved: 2021-05-10

## 2021-05-19 ENCOUNTER — EXTERNAL HOME HEALTH (OUTPATIENT)
Dept: HOME HEALTH SERVICES | Facility: HOSPITAL | Age: 56
End: 2021-05-19

## 2021-07-05 PROBLEM — G93.41 ENCEPHALOPATHY, METABOLIC: Status: ACTIVE | Noted: 2021-07-05

## 2021-07-05 PROBLEM — Z86.79 HISTORY OF ATRIAL FLUTTER: Status: ACTIVE | Noted: 2021-07-05

## 2021-07-06 PROBLEM — R41.82 ALTERED MENTAL STATUS: Status: ACTIVE | Noted: 2021-07-06

## 2021-08-04 ENCOUNTER — PATIENT OUTREACH (OUTPATIENT)
Dept: HOME HEALTH SERVICES | Facility: HOSPITAL | Age: 56
End: 2021-08-04

## 2021-08-24 ENCOUNTER — DOCUMENT SCAN (OUTPATIENT)
Dept: HOME HEALTH SERVICES | Facility: HOSPITAL | Age: 56
End: 2021-08-24

## 2021-08-27 ENCOUNTER — EXTERNAL HOME HEALTH (OUTPATIENT)
Dept: HOME HEALTH SERVICES | Facility: HOSPITAL | Age: 56
End: 2021-08-27

## 2021-09-23 ENCOUNTER — OUTPATIENT CASE MANAGEMENT (OUTPATIENT)
Dept: ADMINISTRATIVE | Facility: OTHER | Age: 56
End: 2021-09-23

## 2021-10-04 ENCOUNTER — OUTPATIENT CASE MANAGEMENT (OUTPATIENT)
Dept: ADMINISTRATIVE | Facility: OTHER | Age: 56
End: 2021-10-04

## 2021-10-06 ENCOUNTER — OUTPATIENT CASE MANAGEMENT (OUTPATIENT)
Dept: ADMINISTRATIVE | Facility: OTHER | Age: 56
End: 2021-10-06

## 2021-10-07 ENCOUNTER — OUTPATIENT CASE MANAGEMENT (OUTPATIENT)
Dept: ADMINISTRATIVE | Facility: OTHER | Age: 56
End: 2021-10-07

## 2021-10-09 PROBLEM — E11.10 DIABETIC KETOACIDOSIS ASSOCIATED WITH TYPE 2 DIABETES MELLITUS: Status: ACTIVE | Noted: 2020-11-12

## 2021-10-09 PROBLEM — E11.10 DIABETIC KETOACIDOSIS ASSOCIATED WITH TYPE 2 DIABETES MELLITUS: Status: RESOLVED | Noted: 2020-11-12 | Resolved: 2021-10-09

## 2021-10-11 PROBLEM — E11.10 DIABETIC KETOACIDOSIS ASSOCIATED WITH TYPE 2 DIABETES MELLITUS: Status: ACTIVE | Noted: 2021-10-11

## 2021-10-18 ENCOUNTER — OUTPATIENT CASE MANAGEMENT (OUTPATIENT)
Dept: ADMINISTRATIVE | Facility: OTHER | Age: 56
End: 2021-10-18

## 2021-11-02 ENCOUNTER — OUTPATIENT CASE MANAGEMENT (OUTPATIENT)
Dept: ADMINISTRATIVE | Facility: OTHER | Age: 56
End: 2021-11-02
Payer: MEDICARE

## 2021-11-09 ENCOUNTER — EXTERNAL HOME HEALTH (OUTPATIENT)
Dept: HOME HEALTH SERVICES | Facility: HOSPITAL | Age: 56
End: 2021-11-09
Payer: MEDICARE

## 2021-11-16 ENCOUNTER — OUTPATIENT CASE MANAGEMENT (OUTPATIENT)
Dept: ADMINISTRATIVE | Facility: OTHER | Age: 56
End: 2021-11-16
Payer: MEDICARE

## 2021-12-09 ENCOUNTER — PATIENT OUTREACH (OUTPATIENT)
Dept: ADMINISTRATIVE | Facility: CLINIC | Age: 56
End: 2021-12-09
Payer: MEDICARE

## 2021-12-09 ENCOUNTER — EXTERNAL HOSPITAL ADMISSION (OUTPATIENT)
Dept: ADMINISTRATIVE | Facility: CLINIC | Age: 56
End: 2021-12-09
Payer: MEDICARE

## 2021-12-14 ENCOUNTER — OUTPATIENT CASE MANAGEMENT (OUTPATIENT)
Dept: ADMINISTRATIVE | Facility: OTHER | Age: 56
End: 2021-12-14
Payer: MEDICARE

## 2021-12-26 PROBLEM — U07.1 COVID-19: Status: ACTIVE | Noted: 2021-12-26

## 2021-12-26 PROBLEM — D68.9 COAGULOPATHY: Status: ACTIVE | Noted: 2021-12-26

## 2021-12-30 ENCOUNTER — EXTERNAL HOME HEALTH (OUTPATIENT)
Dept: HOME HEALTH SERVICES | Facility: HOSPITAL | Age: 56
End: 2021-12-30
Payer: MEDICARE

## 2021-12-30 ENCOUNTER — PATIENT OUTREACH (OUTPATIENT)
Dept: HOME HEALTH SERVICES | Facility: HOSPITAL | Age: 56
End: 2021-12-30
Payer: MEDICARE

## 2022-01-13 ENCOUNTER — OUTPATIENT CASE MANAGEMENT (OUTPATIENT)
Dept: ADMINISTRATIVE | Facility: OTHER | Age: 57
End: 2022-01-13
Payer: MEDICARE

## 2022-01-13 NOTE — PROGRESS NOTES
Outpatient Care Management  Plan of Care Follow Up Visit    Patient: Sherice Avalos  MRN: [id number]  Date of Service: 01/13/2022  Completed by: Daljit Franco RN  Referral Date: 09/17/2021  Program: Case Management (High Risk)    Reason for Visit   Patient presents with    Case Closure    Update Plan Of Care       Brief Summary: Ms. Avalos is being followed by home health and other medical staff after her recent discharge from the hospital. I have provided her and her daughter Kavita with all of the education available to better manage her diabetes. Ms. Avalos remains compliant for a few weeks and then noncompliant. Kavita denies any other resources at this time. Will close case today.     Patient Summary     Involvement of Care:  Do I have permission to speak with other family members about your care?   yes daughter     Patient Reported Labs & Vitals:  1.  Any Patient Reported Labs & Vitals?   no  2.  Patient Reported Blood Pressure:     3.  Patient Reported Pulse:     4.  Patient Reported Weight (Kg):     5.  Patient Reported Blood Glucose (mg/dl):       Medical and social history was reviewed with patient and/or caregiver.     Clinical Assessment     Reviewed and provided basic information on available community resources for mental health, transportation, wellness resources, and palliative care programs with patient and/or caregiver.     Complex Care Plan     Care plan was discussed and completed today with input from patient and/or caregiver.    Patient Instructions     Instructions were provided via the [employer] patient resources and are available for the patient to view on the patient portal.    Next Steps: Discharge from OPCM at this time, all resources provided.     No follow-ups on file.    Todays O[facility] Self-Management Care Plan was developed with the patients/caregivers input and was based on identified barriers from todays assessment.  Goals were written today with the patient/caregiver  and the patient has agreed to work towards these goals to improve his/her overall well-being. Patient verbalized understanding of the care plan, goals, and all of today's instructions. Encouraged patient/caregiver to communicate with his/her physician and health care team about health conditions and the treatment plan.  Provided my contact information today and encouraged patient/caregiver to call me with any questions as needed.

## 2022-01-18 ENCOUNTER — EXTERNAL HOME HEALTH (OUTPATIENT)
Dept: HOME HEALTH SERVICES | Facility: HOSPITAL | Age: 57
End: 2022-01-18
Payer: MEDICARE

## 2022-01-23 ENCOUNTER — PATIENT OUTREACH (OUTPATIENT)
Dept: ADMINISTRATIVE | Facility: CLINIC | Age: 57
End: 2022-01-23
Payer: MEDICARE

## 2022-01-23 ENCOUNTER — EXTERNAL HOSPITAL ADMISSION (OUTPATIENT)
Dept: ADMINISTRATIVE | Facility: CLINIC | Age: 57
End: 2022-01-23
Payer: MEDICARE

## 2022-01-23 NOTE — PROGRESS NOTES
C3 nurse spoke with Sherice Avalos's daughter for a TCC post hospital discharge follow up call. The patient does not have a scheduled HOSFU appointment with Dr. Pool within 7 days post hospital discharge date 1/22/2022. C3 nurse was unable to schedule HOSFU appointment in [facility].    Message sent to PCP staff requesting they contact patient and schedule follow up appointment.

## 2022-01-23 NOTE — PATIENT INSTRUCTIONS
Patient Education       Chest Pain Discharge Instructions   About this topic   Chest pain is felt in the upper part of your body from your neck to your belly. You may feel pain, pressure, or tightness. Many things can cause chest pain. Some are serious things like heart disease or lung disease. Less serious things like stomach problems can also cause chest pain.  The doctors may not be able to find all serious causes of chest pain the first time they see you. It is important that you follow up with your doctor. Treatment will depend on what is causing your chest pain       What care is needed at home?   · Ask your doctor what you need to do when you go home. Make sure you ask questions if you do not understand what the doctor says.  · Follow your regular doctors orders to keep your blood pressure, cholesterol, and high blood sugar (diabetes) under control.  · If you smoke, try to quit. Your doctor or nurse can help.  · Keep a healthy weight. If you are too heavy, lose weight.  · Eat a healthy diet, as discussed with your doctor or nurse.  What follow-up care is needed?   · Your doctor may ask you to make visits to the office to check on your progress. Be sure to keep these visits.  · Your doctor will tell you if other tests are needed.  · You doctor will tell you if you need to see a specialist like a heart doctor or cardiologist.  · Your doctor may order a heart rehab program for you after you leave the hospital. This is an important part of your care. Share your discharge information with the rehab staff so they can plan a program to help you recover. Let your doctor know if you need help finding a program.  What drugs may be needed?   If chest pain is caused by a heart-related problem, the doctor may order drugs to:  · Thin the blood  · Dissolve a blood clot  · Lower cholesterol  · Lessen the work of your heart  · Correct or prevent an abnormal heartbeat  · Lower your blood pressure  · Increase blood flow to the  heart muscle  · Relax the heart and help avoid spasms in the arteries  Check with your doctor before you take drugs like ibuprofen, naproxen, vitamin, or hormone replacement therapy.  If chest pain is caused by a something other than your heart, the doctor may order drugs to:  · Help with pain  · Treat stomach problems  · Help with breathing  · Help you relax  · Control coughing  Will physical activity be limited?   · Limit activities that can trigger chest pain.  · You may need to be less active at first, and then slowly return to normal levels. Talk to your doctor about the right amount of activity for you.  What problems could happen?   · Heart attack  · Other heart problems  · Blood clot in the lungs  When do I need to call the doctor?   Activate the emergency medical system right away if you have signs of a heart attack. Call 911 in the [place] or [place]. The sooner treatment begins, the better your chances for recovery. Call for emergency help right away if you have:  · Signs of heart attack, which may include:  ? Severe chest pain, pressure, or discomfort with:  § Breathing trouble; sweating; upset stomach; or cold, clammy skin.  § Pain in your arms, back, or jaw.  § Worse pain with activity like walking up stairs.  · Fast or irregular heartbeat.  · Feeling dizzy, faint, or weak.  · Do not drive yourself to the hospital or have someone drive you. The emergency rescue people can begin to treat you the minute they arrive.       · If you are to take nitroglycerin pills or spray with chest pain:  ? Rest. Sit or lie down.  ? Spray or place one pill under your tongue and let it melt.  ? If the pain is not better or is getting worse after 5 minutes, call for emergency help. Then take one more spray or pill under your tongue.  ? If the pain does not get better after another 3 to 5 minutes, take a third spray or pill under your tongue.  ? Drink a sip of water to wet your mouth if it is too dry to let the pills  break down.  · If nitroglycerin pills or spray have been ordered, always carry some with you and make sure they are not out of date. They need to be replaced 6 to 12 months after opening the bottle. Keep them in their original bottle and at room temperature. The pill should burn or tingle when you put it under your tongue. If it does not, it may need to be replaced.  Call your doctor if:  · You have a fever of 100.4°F (38°C) or higher or chills.  · You cough up yellow or green mucus.  · You are more tired than normal or more trouble breathing with activity.  Teach Back: Helping You Understand   The Teach Back Method helps you understand the information we are giving you. After you talk with the staff, tell them in your own words what you learned. This helps to make sure the staff has described each thing clearly. It also helps to explain things that may have been confusing. Before going home, make sure you can do these:  · I can tell you about my pain.  · I can tell you when I can go back to my normal activities.  · I can tell you what I will do if I have signs of a heart attack.  Where can I learn more?   American Heart Association  http://www.heart.org/HEARTORG/Conditions/HeartAttack/AboutHeartAttacks/About-Heart-Attacks_UCM_002038_Article.jsp   American Heart Association  http://www.heart.org/HEARTORG/Conditions/HeartAttack/SymptomsDiagnosisofHeartAttack/Angina-[email or web address]_UC_450308_Article.jsp   FamilyDoctor.org  http://familydoctor.org/familydoctor/en/diseases-conditions/angina.printerview.all.html   NHS Choices  https://www.nhs.uk/conditions/chest-pain/   Last Reviewed Date   2021-06-16  Consumer Information Use and Disclaimer   This information is not specific medical advice and does not replace information you receive from your health care provider. This is only a brief summary of general information. It does NOT include all information about conditions, illnesses, injuries, tests, procedures, treatments,  therapies, discharge instructions or life-style choices that may apply to you. You must talk with your health care provider for complete information about your health and treatment options. This information should not be used to decide whether or not to accept your health care providers advice, instructions or recommendations. Only your health care provider has the knowledge and training to provide advice that is right for you.  Copyright   Copyright © 2021 Up[employer]Date, Inc. and its affiliates and/or licensors. All rights reserved.  [employer] teaching reviewed with Sherice Avalos's daughter . She verbalized understanding.    Education was provided based on the patient's discharge diagnosis using the attached [employer] patient education as a reference.

## 2022-01-24 NOTE — TELEPHONE ENCOUNTER
nurse spoke with Sherice Avalos's daughter for a TCC post hospital discharge follow up call. The patient does not have a scheduled HOSFU appointment with Dr. Pool within 7-14 days post hospital discharge date 1/22/2022. C3 nurse was unable to schedule HOSFU appointment in [facility].     Please contact patient and schedule follow up appointment using HOSFU visit type on or before 1/29/2022.       Please do not reply to this message, as this inbox is not routinely monitored.   Routing comment     Spoke to Kavita lombardo's daughter given appt // 2/1/22 [email or web address]

## 2022-01-31 ENCOUNTER — PATIENT OUTREACH (OUTPATIENT)
Dept: ADMINISTRATIVE | Facility: CLINIC | Age: 57
End: 2022-01-31
Payer: MEDICARE

## 2022-01-31 NOTE — PROGRESS NOTES
C3 nurse spoke with Sherice Avalos   daughter for a TCC post hospital discharge follow up call. The patient has a scheduled HOSFU appointment with Nettie Pool MD   on 2/1 @ 3pm.

## 2022-01-31 NOTE — PATIENT INSTRUCTIONS
Patient Education       Hyperglycemia Discharge Instructions, Adult   About this topic   High blood sugar is also known as hyperglycemia. Sugar is in the food you eat. Your body needs insulin to use the sugar in your bloodstream. Having the right amount of insulin controls the amount of sugar in your blood. If you do not have enough insulin, or if the body cannot detect the insulin, the glucose or sugar stays in your blood instead of going into your cells. This causes your blood sugar levels to be too high.  Many things can cause high blood sugar. Some are serious things like problems with your pancreas or an infection. Less serious things, like stress or certain medicines, can also cause high blood sugar.     What care is needed at home?   · Ask your doctor what you need to do when you go home. Make sure you ask questions if you do not understand what the doctor says.  · Take all your medicines as ordered.  · Drink water whenever you are thirsty. Water helps remove the extra sugar from your blood. Avoid sugary drinks like fruit juice, sports drinks, and regular soda.  · Exercise can help lower your blood sugar. Check with your regular doctor to find out what types of physical activity are best for you.  · If you have been given an eating plan, try to follow it more carefully. Ask for help from a dietitian.  · If you are feeling worried or anxious, try to find ways to manage your stress. Some people find methods like meditation, deep breathing, and relaxation helpful. Activities like yoga, exercise, and loretta chi can also help with stress.  · If you were told to check your blood sugar at home, make sure you know how and when to check it.  What follow-up care is needed?   · Your doctor may ask you to make visits to the office to check on your progress. Be sure to keep these visits.  · Your doctor may order a blood test to check how your sugar levels have been over time. This is a hemoglobin A1C level. You may need to  have these done every 3 to 6 months. Be sure to have this test done as ordered.  Will physical activity be limited?   Talk with your doctor about an exercise plan that is right for you.  What problems could happen?   · High levels of acids called ketones build up in the blood. This is a very serious problem.  · Infection  · Injury to blood vessels and nerves  What can be done to prevent this health problem?   · Control your weight.  · Limit beer, wine, and mixed drinks (alcohol).  · Check your blood sugar levels as instructed.  · Know the signs of high blood sugar and get help when needed.  When do I need to call the doctor?   · You have a seizure.  · You are having so much trouble breathing that you can only say 1 or 2 words at a time.  · You need to sit upright at all times to be able to breathe and or cannot lie down.  · You are very tired from working to catch your breath or you are sweating from trying to breathe.  · You have belly pain, an upset stomach, or are throwing up.  · You become very confused or lethargic, or cannot be awakened.  · You are urinating much more than usual and eating much more than usual but losing weight.  · You have signs of severe fluid loss, such as:  ? No urine for more than 8 hours.  ? You feel very light-headed or like you are going to pass out.  ? You feel weak like you are going to fall.  · You have a fever of 100.4°F (38°C) or higher or chills for more than 1 day.  · You develop early signs of fluid loss, such as:  ? Your urine is very dark-colored.  ? Your mouth is dry.  ? You have muscle cramps.  ? You have a lack of energy.  ? You feel light-headed when you get up.  · You lose weight without trying.  · Signs of low blood sugar. These include anger, paleness, shaking, a fast heartbeat, confusion, or sweating. Keep hard candies, glucose tablets, liquid glucose, or juice on hand for low blood sugar. Ask your doctor how much you need to take to treat low blood sugar.  · Signs of  high blood sugar. These include sleepiness, blurry eyesight, passing urine more often, increased thirst, breath has a fruity sweet smell, fast breathing, upset stomach and throwing up, dizziness, or passing out. If you test for ketones, these are usually high.  Teach Back: Helping You Understand   The Teach Back Method helps you understand the information we are giving you. After you talk with the staff, tell them in your own words what you learned. This helps to make sure the staff has described each thing clearly. It also helps to explain things that may have been confusing. Before going home, make sure you can do these:  · I can tell you about my condition.  · I can tell you how often I need to check my blood sugar.  · I can tell you the signs of high blood sugar and what I will do if I have them.  · I can tell you the signs of low blood sugar and what I will do if I have them.  Where can I learn more?   American Academy of Family Physicians  https://familydoctor.org/condition/diabetes/   American Diabetes Association  http://www.diabetes.org/living-with-diabetes/treatment-and-care/blood-glucose-control/hyperglycemia.html   Last Reviewed Date   2021-06-08  Consumer Information Use and Disclaimer   This information is not specific medical advice and does not replace information you receive from your health care provider. This is only a brief summary of general information. It does NOT include all information about conditions, illnesses, injuries, tests, procedures, treatments, therapies, discharge instructions or life-style choices that may apply to you. You must talk with your health care provider for complete information about your health and treatment options. This information should not be used to decide whether or not to accept your health care providers advice, instructions or recommendations. Only your health care provider has the knowledge and training to provide advice that is right for you.  Copyright    Copyright © 2021 [employer] and its affiliates and/or licensors. All rights reserved.  Liseth teaching reviewed with Sherice Avalos daughter  . She verbalized understanding.    Education was provided based on the patient's discharge diagnosis using the attached [employer] patient education as a reference.

## 2022-04-16 PROBLEM — R50.9 FEVER: Status: ACTIVE | Noted: 2022-04-16

## 2022-04-16 PROBLEM — R41.82 ALTERED MENTAL STATUS: Status: RESOLVED | Noted: 2021-07-06 | Resolved: 2022-04-16

## 2022-04-16 PROBLEM — N63.0 BREAST SWELLING: Status: RESOLVED | Noted: 2021-02-02 | Resolved: 2022-04-16

## 2022-04-16 PROBLEM — E87.1 HYPONATREMIA: Status: RESOLVED | Noted: 2017-04-18 | Resolved: 2022-04-16

## 2022-04-16 PROBLEM — E87.5 HYPERKALEMIA: Status: RESOLVED | Noted: 2017-05-21 | Resolved: 2022-04-16

## 2022-04-20 PROBLEM — E66.09 CLASS 1 OBESITY DUE TO EXCESS CALORIES WITH SERIOUS COMORBIDITY AND BODY MASS INDEX (BMI) OF 32.0 TO 32.9 IN ADULT: Status: ACTIVE | Noted: 2022-04-20

## 2022-04-23 PROBLEM — G93.41 ENCEPHALOPATHY, METABOLIC: Status: RESOLVED | Noted: 2021-07-05 | Resolved: 2022-04-23

## 2022-04-26 PROBLEM — E87.1 HYPONATREMIA: Status: RESOLVED | Noted: 2017-04-18 | Resolved: 2022-04-26

## 2022-04-28 PROBLEM — I21.4 NSTEMI (NON-ST ELEVATED MYOCARDIAL INFARCTION): Status: RESOLVED | Noted: 2018-12-20 | Resolved: 2022-04-28

## 2022-04-28 PROBLEM — R07.9 CHEST PAIN: Status: ACTIVE | Noted: 2022-04-28

## 2022-05-20 PROBLEM — R53.81 DEBILITY: Status: RESOLVED | Noted: 2019-03-26 | Resolved: 2022-05-20

## 2022-05-21 PROBLEM — Z99.2 END STAGE RENAL DISEASE ON DIALYSIS: Status: ACTIVE | Noted: 2018-12-11

## 2022-05-23 PROBLEM — E87.1 HYPONATREMIA: Status: ACTIVE | Noted: 2022-05-23

## 2022-05-24 PROBLEM — I82.A29: Status: ACTIVE | Noted: 2022-05-24

## 2022-05-24 PROBLEM — I82.C22: Status: ACTIVE | Noted: 2022-05-24

## 2022-05-24 PROBLEM — I82.C21 CHRONIC EMBOLISM AND THROMBOSIS OF RIGHT INTERNAL JUGULAR VEIN: Status: ACTIVE | Noted: 2022-05-24

## 2022-06-06 ENCOUNTER — OUTPATIENT CASE MANAGEMENT (OUTPATIENT)
Dept: ADMINISTRATIVE | Facility: OTHER | Age: 57
End: 2022-06-06
Payer: MEDICARE

## 2022-06-06 ENCOUNTER — PATIENT MESSAGE (OUTPATIENT)
Dept: ADMINISTRATIVE | Facility: OTHER | Age: 57
End: 2022-06-06
Payer: MEDICARE

## 2022-06-06 NOTE — PROGRESS NOTES
Outpatient Care Management  Initial Patient Assessment    Patient: Sherice Avalos  MRN: [id number]  Date of Service: 06/06/2022  Completed by: Kim Robledo RN  Referral Date: 05/23/2022  Program: High Risk  Status: Ongoing  Effective Dates: 6/6/2022 - present  Responsible Staff: Kim Robledo RN    Reason for Visit   Patient presents with    OPCM Chart Review     6/6/22    OPCM Enrollment Call     6/6/22    Initial Assessment     6/6/22    Nursing Assessment     6/6/22    Plan Of Care     6/6/22    Other Misc     6/6/22    OPCM Welcome Letter     6/6/22     Brief Summary:  Sherice Avalos was referred by Dr. Sherlyn Quinonez at [facility] for acute respiratory failure w/hypoxia. Patient qualifies for program based on high risk score of 97.1%.   Active problem list, medical, surgical and social history reviewed. Active comorbidities include abdominal pain, asthma, CHF, COPD, DKA, ESRD on HD, HLD, HTN, strike, medication non compliance, DM I, CAD w/stents. Areas of need identified by patient include ESRD.     Complex care plan created with patient/caregiver input. By next encounter, patient agrees to review educational materials (Dialysis and Diet , Bone Problems Caused by Kidney Disease , End Stage Kidney Disease) after she receives them in the mail.     See ESRD Care Plan for all other details.    CM ACTION PLAN:  · Send in basket message to Dr. Pool informing her that Mrs. Avalos has been enrolled in the OPCM program.   Assessment Documentation     OPCM Initial Assessment    Involvement of Care  Do I have permission to speak with other family members about your care?: Yes  Assessment completed by: Patient  Identified Areas of Need  Advanced Care Planning: No  Housing: no  Medication Adherence: No  *Active medication list was reviewed and reconciled with patient and/or caregiver:   Nutrition: no  Lab Adherence: no  Depression: No  Cognitive/Behavioral Health: no  Communication: no  Health Literacy:  yes  Fall risk?: Yes  Equipment/Supplies/Services: no (Comment: marilyn rodríguez NP is assisting with obtaining home O2 and CPAP device)          Problem List and History     Problems Addressed This Visit    Chronic Kidney Disease: Identified as current problem         Reviewed medical and social history with patient and/or caregiver. A complex care plan was discussed and completed today, with input from patient and/or caregiver.    Patient Instructions     Instructions were provided via the [employer] patient resources and are available for the patient to view on the patient portal, if active.    Next steps:   · Confirm receipt of educational materials  · Begin education on signs and symptoms of new or worsening End Stage Renal Disease : nausea or vomiting; fever of 100.4°F; unexpected weight gain or swelling in the legs, ankles, or around the eyes; decreased or absent urine output    Follow up in about 8 days (around 6/14/2022).    [facility] OPCM Self-Management Care Plan was developed with the patients/caregivers input and was based on identified barriers from todays assessment.  Goals were written today with the patient/caregiver and the patient has agreed to work towards these goals to improve his/her overall well-being. Patient verbalized understanding of the care plan, goals, and all of today's instructions. Encouraged patient/caregiver to communicate with his/her physician and health care team about health conditions and the treatment plan.  Provided my contact information today and encouraged patient/caregiver to call me with any questions as needed.

## 2022-06-06 NOTE — LETTER
June 6, 2022             Dear ,    Welcome to [facility]s Complex Care Management Program.  It was a pleasure talking with you today.  My name is Kim Robledo, and I look forward to being your Care Manager.  My goal is to help you function at the healthiest and highest level possible.  You can contact me directly at [phone].    As an [facility] patient, some of the services we may be able to provide include:      Development of an individualized care plan with a Registered Nurse    Connection with a    Connection with available resources and services     Coordinate communication among your care team members    Provide coaching and education    Help you understand your doctors treatment plan   Help you obtain information about your insurance coverage.     All services provided by [facility]s Complex Care Managers and other care team members are coordinated with and communicated to your primary care team.      As part of your enrollment, you will be receiving education materials and more information about these services in your [facility] account, by phone or through the mail.  If you do not wish to participate or receive information, please contact our office at [phone].      Sincerely,        Kim Robledo RN  [facility]   Out-patient RN Complex Care Manager

## 2022-06-14 ENCOUNTER — OUTPATIENT CASE MANAGEMENT (OUTPATIENT)
Dept: ADMINISTRATIVE | Facility: OTHER | Age: 57
End: 2022-06-14
Payer: MEDICARE

## 2022-06-14 NOTE — PROGRESS NOTES
Outpatient Care Management  Plan of Care Follow Up Visit    Patient: Sherice Avalos  MRN: [id number]  Date of Service: 06/14/2022  Completed by: Kim Robledo RN  Referral Date: 05/23/2022  Program:  Outpatient High Risk Case Management Program       Reason for Visit   Patient presents with    OPCM Chart Review     6/14/22, 6/30/22    Update Plan Of Care     6/30/22    OPCM RN First Follow-Up Attempt     6/14/22 6/14/22  Mrs. Avalos is currently admitted to [facility]   Kim Robledo RN    6/30/22  Brief Summary:  See ESRD Care Plan for updates/details R/T this encounter.  Kim Robledo RN    Patient Summary     Involvement of Care:  Do I have permission to speak with other family members about your care?       Patient Reported Labs & Vitals:  1.  Any Patient Reported Labs & Vitals?     2.  Patient Reported Blood Pressure:     3.  Patient Reported Pulse:     4.  Patient Reported Weight (Kg):     5.  Patient Reported Blood Glucose (mg/dl):       Medical and social history was reviewed with patient and/or caregiver.     Clinical Assessment     Reviewed and provided basic information on available community resources for mental health, transportation, wellness resources, and palliative care programs with patient and/or caregiver.     Complex Care Plan     Care plan was discussed and completed today with input from patient and/or caregiver.    Patient Instructions     Instructions were provided via the [employer] patient resources and are available for the patient to view on the patient portal.    Next Steps:  · Instruct  to call 911 if she begins to experience any of the following symptoms - severe weakness, dizziness, fainting, drowsiness or confusion; chest pain or shortness of breath; heart beating fast, slow, or irregularly.  · Review eating and nutrition habits with patient      Follow up in about 1 week (around 7/7/2022).    Todays OPCM Self-Management Care Plan was developed with the  patients/caregivers input and was based on identified barriers from todays assessment.  Goals were written today with the patient/caregiver and the patient has agreed to work towards these goals to improve his/her overall well-being. Patient verbalized understanding of the care plan, goals, and all of today's instructions. Encouraged patient/caregiver to communicate with his/her physician and health care team about health conditions and the treatment plan.  Provided my contact information today and encouraged patient/caregiver to call me with any questions as needed.

## 2022-07-03 PROBLEM — I10 ESSENTIAL HYPERTENSION: Status: ACTIVE | Noted: 2022-07-03

## 2022-07-03 PROBLEM — I38 VALVULAR HEART DISEASE: Status: ACTIVE | Noted: 2022-07-03

## 2022-07-03 PROBLEM — I48.0 PAROXYSMAL ATRIAL FIBRILLATION: Status: ACTIVE | Noted: 2022-07-03

## 2022-07-03 PROBLEM — J96.22 ACUTE ON CHRONIC RESPIRATORY FAILURE WITH HYPOXIA AND HYPERCAPNIA: Status: ACTIVE | Noted: 2022-07-03

## 2022-07-03 PROBLEM — R73.09 LABILE BLOOD GLUCOSE: Status: ACTIVE | Noted: 2022-07-03

## 2022-07-03 PROBLEM — J96.21 ACUTE ON CHRONIC RESPIRATORY FAILURE WITH HYPOXIA AND HYPERCAPNIA: Status: ACTIVE | Noted: 2022-07-03

## 2022-07-03 PROBLEM — I27.20 PULMONARY HYPERTENSION: Status: ACTIVE | Noted: 2022-07-03

## 2022-07-03 PROBLEM — G93.40 ENCEPHALOPATHY: Status: ACTIVE | Noted: 2022-07-03

## 2022-07-07 ENCOUNTER — OUTPATIENT CASE MANAGEMENT (OUTPATIENT)
Dept: ADMINISTRATIVE | Facility: OTHER | Age: 57
End: 2022-07-07
Payer: MEDICARE

## 2022-07-07 NOTE — PROGRESS NOTES
7/7/22   had an ED visit at [facility] on 7/2/22 for N/V and hyperglycemia then discharged home. Mrs. Avalos was admitted to [facility] on 7/3/22 for AMS and remains admitted at this time. RN case manager will follow up with patient after hospital discharge.   Kim Robledo RN.

## 2022-07-08 ENCOUNTER — OUTPATIENT CASE MANAGEMENT (OUTPATIENT)
Dept: ADMINISTRATIVE | Facility: OTHER | Age: 57
End: 2022-07-08
Payer: MEDICARE

## 2022-07-08 PROBLEM — I27.20 PULMONARY HYPERTENSION: Status: RESOLVED | Noted: 2022-07-03 | Resolved: 2022-07-08

## 2022-07-08 PROBLEM — E87.1 HYPONATREMIA: Status: RESOLVED | Noted: 2022-05-23 | Resolved: 2022-07-08

## 2022-07-08 PROBLEM — Z71.89 ACP (ADVANCE CARE PLANNING): Status: RESOLVED | Noted: 2020-06-18 | Resolved: 2022-07-08

## 2022-07-08 PROBLEM — R11.2 NAUSEA AND VOMITING: Status: RESOLVED | Noted: 2018-04-22 | Resolved: 2022-07-08

## 2022-07-08 PROBLEM — I38 VALVULAR HEART DISEASE: Status: RESOLVED | Noted: 2022-07-03 | Resolved: 2022-07-08

## 2022-07-14 ENCOUNTER — OUTPATIENT CASE MANAGEMENT (OUTPATIENT)
Dept: ADMINISTRATIVE | Facility: OTHER | Age: 57
End: 2022-07-14
Payer: MEDICARE

## 2022-07-14 NOTE — PROGRESS NOTES
7/14/22 - Upon contact with Mrs Smiley's daughter, Kavita, today for scheduled follow up with Mrs Smiley she advised they were just arriving at Mrs Smiley's pulm provider's office for a follow up appt. She agreed to this CM contacting them next week. Shamika Sood RN    7/27/22 - Spoke with daughter, Kavita, again today and she advised Mrs Smiley was admitted to [facility] yesterday related to repeated hypoglycemic episodes. Advised this CM will attempt to follow up next week to confirm she has resumed dialysis and home health services if d/c'd home by then. Shamika Sood RN

## 2022-07-18 PROBLEM — E10.10 DIABETIC KETOACIDOSIS ASSOCIATED WITH TYPE 1 DIABETES MELLITUS: Status: ACTIVE | Noted: 2021-10-11

## 2022-08-02 ENCOUNTER — OUTPATIENT CASE MANAGEMENT (OUTPATIENT)
Dept: ADMINISTRATIVE | Facility: OTHER | Age: 57
End: 2022-08-02
Payer: MEDICARE

## 2022-08-02 NOTE — PROGRESS NOTES
Outpatient Care Management  Plan of Care Follow Up Visit    Patient: Sherice Avalos  MRN: [id number]  Date of Service: 08/02/2022  Completed by: Shamika Sood RN  Referral Date: 05/23/2022  Program:     Reason for Visit   Patient presents with    Update Plan Of Care    Case Closure       Brief Summary: Phone contact made with Mrs Smiley and she is in [facility] where she has been for over a week. Advised this CM will d/c from OPCM today related to current inpatient status. Advised she is eligible for OPCM thru [facility] if she feels it would benefit her in the future when out of the hospital and following up with [facility] providers. She voiced understanding and agreement with plan for d/c.    Patient Summary     Involvement of Care:  Do I have permission to speak with other family members about your care?       Patient Reported Labs & Vitals:  1.  Any Patient Reported Labs & Vitals?     2.  Patient Reported Blood Pressure:     3.  Patient Reported Pulse:     4.  Patient Reported Weight (Kg):     5.  Patient Reported Blood Glucose (mg/dl):       Medical and social history was reviewed with patient and/or caregiver.     Clinical Assessment     Reviewed and provided basic information on available community resources for mental health, transportation, wellness resources, and palliative care programs with patient and/or caregiver.     Complex Care Plan     Care plan was discussed and completed today with input from patient and/or caregiver.    Patient Instructions     Instructions were provided via the [facility] patient resources and are available for the patient to view on the patient portal.    Next Steps: D/C from OPCM today related to numerous back to back inpatient admissions. Shamika Sood RN    No follow-ups on file.    Todays OPCM Self-Management Care Plan was developed with the patients/caregivers input and was based on identified barriers from todays assessment.  Goals were written today  with the patient/caregiver and the patient has agreed to work towards these goals to improve his/her overall well-being. Patient verbalized understanding of the care plan, goals, and all of today's instructions. Encouraged patient/caregiver to communicate with his/her physician and health care team about health conditions and the treatment plan.  Provided my contact information today and encouraged patient/caregiver to call me with any questions as needed.